# Patient Record
Sex: FEMALE | HISPANIC OR LATINO
[De-identification: names, ages, dates, MRNs, and addresses within clinical notes are randomized per-mention and may not be internally consistent; named-entity substitution may affect disease eponyms.]

---

## 2024-06-14 ENCOUNTER — DASHBOARD ENCOUNTERS (OUTPATIENT)
Age: 24
End: 2024-06-14

## 2024-06-14 ENCOUNTER — LAB OUTSIDE AN ENCOUNTER (OUTPATIENT)
Dept: URBAN - METROPOLITAN AREA CLINIC 109 | Facility: CLINIC | Age: 24
End: 2024-06-14

## 2024-06-14 ENCOUNTER — OFFICE VISIT (OUTPATIENT)
Dept: URBAN - METROPOLITAN AREA CLINIC 109 | Facility: CLINIC | Age: 24
End: 2024-06-14
Payer: COMMERCIAL

## 2024-06-14 VITALS
WEIGHT: 167.2 LBS | DIASTOLIC BLOOD PRESSURE: 77 MMHG | TEMPERATURE: 97.9 F | SYSTOLIC BLOOD PRESSURE: 138 MMHG | BODY MASS INDEX: 24.76 KG/M2 | HEART RATE: 89 BPM | HEIGHT: 69 IN

## 2024-06-14 DIAGNOSIS — E80.4 GILBERT SYNDROME: ICD-10-CM

## 2024-06-14 DIAGNOSIS — R14.0 ABDOMINAL BLOATING WITH CRAMPS: ICD-10-CM

## 2024-06-14 DIAGNOSIS — R19.5 LOOSE STOOLS: ICD-10-CM

## 2024-06-14 PROBLEM — 27503000: Status: ACTIVE | Noted: 2024-06-14

## 2024-06-14 PROCEDURE — 99204 OFFICE O/P NEW MOD 45 MIN: CPT | Performed by: INTERNAL MEDICINE

## 2024-06-14 RX ORDER — PANTOPRAZOLE SODIUM 40 MG/1
1 TABLET TABLET, DELAYED RELEASE ORAL ONCE A DAY
Qty: 90 | Refills: 0 | OUTPATIENT
Start: 2024-06-14

## 2024-06-14 NOTE — HPI-TODAY'S VISIT:
# [id number]  took abx for vaginitis, since then abd cramping, loose floating stool, and bloating and gas duration 6 months stable  showed me US RUQ normal LFT 6/2023 tbili 3.5 repeat showed normal bili but mostly indirect  h pylori neg

## 2024-06-17 LAB
IMMUNOGLOBULIN A: 202
INTERPRETATION: (no result)
TISSUE TRANSGLUTAMINASE AB, IGA: <1